# Patient Record
Sex: FEMALE | URBAN - METROPOLITAN AREA
[De-identification: names, ages, dates, MRNs, and addresses within clinical notes are randomized per-mention and may not be internally consistent; named-entity substitution may affect disease eponyms.]

---

## 2019-11-05 ENCOUNTER — APPOINTMENT (OUTPATIENT)
Age: 59
Setting detail: DERMATOLOGY
End: 2019-11-06

## 2019-11-05 DIAGNOSIS — Z41.9 ENCOUNTER FOR PROCEDURE FOR PURPOSES OTHER THAN REMEDYING HEALTH STATE, UNSPECIFIED: ICD-10-CM

## 2019-11-05 PROCEDURE — OTHER CHEMICAL PEEL: OTHER

## 2019-11-05 ASSESSMENT — LOCATION ZONE DERM
LOCATION ZONE: FACE
LOCATION ZONE: NECK
LOCATION ZONE: LIP
LOCATION ZONE: NOSE

## 2019-11-05 ASSESSMENT — LOCATION DETAILED DESCRIPTION DERM
LOCATION DETAILED: SUPERIOR MID FOREHEAD
LOCATION DETAILED: LEFT CENTRAL MALAR CHEEK
LOCATION DETAILED: RIGHT UPPER CUTANEOUS LIP
LOCATION DETAILED: LEFT LOWER CUTANEOUS LIP
LOCATION DETAILED: LEFT SUPERIOR LATERAL NECK
LOCATION DETAILED: RIGHT SUPERIOR LATERAL NECK
LOCATION DETAILED: NASAL DORSUM
LOCATION DETAILED: RIGHT CENTRAL MALAR CHEEK

## 2019-11-05 ASSESSMENT — LOCATION SIMPLE DESCRIPTION DERM
LOCATION SIMPLE: SUPERIOR FOREHEAD
LOCATION SIMPLE: RIGHT CHEEK
LOCATION SIMPLE: RIGHT LIP
LOCATION SIMPLE: LEFT CHEEK
LOCATION SIMPLE: LEFT LIP
LOCATION SIMPLE: RIGHT ANTERIOR NECK
LOCATION SIMPLE: LEFT ANTERIOR NECK
LOCATION SIMPLE: NOSE

## 2019-11-05 NOTE — HPI: COSMETIC (FACIAL)
Have You Had A Facial Before?: has not had a previous facial
When Outside In The Sun, Do You...: mostly tans, rarely burns
Additional History: Pt here for chemical peel. Went over expectations during and after peel. Pt also bought microneedling package in 2017 and did not like procedure and is using remainder dollar amount for other services. Balance remaining after today’s service is $1025.\\nJessner peel. \\nPCA toner. Poly eyes lips nose. Fan. PCA peel with HQ and res. 2 passes face and neck. Ultra peel 1. 2 passes face. Intensive brightening and age refining. Booster. Rebalance.

## 2019-11-05 NOTE — PROCEDURE: CHEMICAL PEEL
Prep: The treated area was degreased with pre-peel cleanser, and vaseline was applied for protection of mucous membranes.
Number Of Layers: 4
Chemical Peel: Clementina
Treatment Number: 1
Consent: Prior to the procedure, written consent was obtained and risks were reviewed, including but not limited to: redness, peeling, blistering, pigmentary change, scarring, infection, and pain.
Detail Level: Zone
Post Peel Care: After the procedure, a post-peel cream was applied to the treated areas. Sun protection and post-care instructions were reviewed with the patient.
Booster To Be Used At Home?: no
Price (Use Numbers Only, No Special Characters Or $): 0
Post-Care Instructions: I reviewed with the patient in detail post-care instructions. Patient should avoid sun exposure and wear sun protection.
Treatment Time (Optional): 30 minute

## 2020-02-18 ENCOUNTER — APPOINTMENT (OUTPATIENT)
Age: 60
Setting detail: DERMATOLOGY
End: 2020-02-20

## 2020-02-18 DIAGNOSIS — Z41.9 ENCOUNTER FOR PROCEDURE FOR PURPOSES OTHER THAN REMEDYING HEALTH STATE, UNSPECIFIED: ICD-10-CM

## 2020-02-18 PROCEDURE — OTHER DERMASWEEP: OTHER

## 2020-02-18 ASSESSMENT — LOCATION ZONE DERM: LOCATION ZONE: FACE

## 2020-02-18 ASSESSMENT — LOCATION DETAILED DESCRIPTION DERM: LOCATION DETAILED: RIGHT MEDIAL FOREHEAD

## 2020-02-18 ASSESSMENT — LOCATION SIMPLE DESCRIPTION DERM: LOCATION SIMPLE: RIGHT FOREHEAD

## 2020-02-18 NOTE — HPI: COSMETIC (FACIAL)
Have You Had A Facial Before?: has had a previous facial
When Outside In The Sun, Do You...: mostly tans, rarely burns
Additional History: Dermasweep with infusion.\\nCleanse with BFW. Remove with hot towels. Alcohol prep, microderm with black tip 3 passes face 2 passes neck. Face 12. Neck 8. Eyes 4. Nose 12. Cool towel. Nucell infusion with silk tip. Face 10. Neck 8. Eyes 4. Nose 10. Intensive age and brightening. B3 serum. Vitamin c and hydrating serum intellishade original. Rec deep pore facial for extractions.

## 2020-03-09 ENCOUNTER — APPOINTMENT (OUTPATIENT)
Age: 60
Setting detail: DERMATOLOGY
End: 2020-03-10

## 2020-03-09 DIAGNOSIS — Z41.9 ENCOUNTER FOR PROCEDURE FOR PURPOSES OTHER THAN REMEDYING HEALTH STATE, UNSPECIFIED: ICD-10-CM

## 2020-03-09 DIAGNOSIS — L90.8 OTHER ATROPHIC DISORDERS OF SKIN: ICD-10-CM

## 2020-03-09 PROCEDURE — OTHER ADDITIONAL NOTES: OTHER

## 2020-03-09 PROCEDURE — OTHER BOTOX: OTHER

## 2020-03-09 PROCEDURE — OTHER FACIAL: OTHER

## 2020-03-09 ASSESSMENT — LOCATION ZONE DERM
LOCATION ZONE: FACE
LOCATION ZONE: SCALP
LOCATION ZONE: SCALP
LOCATION ZONE: FACE

## 2020-03-09 ASSESSMENT — LOCATION DETAILED DESCRIPTION DERM
LOCATION DETAILED: LEFT SUPERIOR FOREHEAD
LOCATION DETAILED: SUPERIOR MID FOREHEAD
LOCATION DETAILED: LEFT CENTRAL OCCIPITAL SCALP
LOCATION DETAILED: LEFT LATERAL EYEBROW
LOCATION DETAILED: LEFT INFERIOR MEDIAL FOREHEAD
LOCATION DETAILED: LEFT CENTRAL FRONTAL SCALP
LOCATION DETAILED: RIGHT INFERIOR FOREHEAD
LOCATION DETAILED: LEFT INFERIOR FOREHEAD
LOCATION DETAILED: LEFT SUPERIOR MEDIAL FOREHEAD
LOCATION DETAILED: LEFT MID TEMPLE
LOCATION DETAILED: GLABELLA
LOCATION DETAILED: RIGHT SUPERIOR LATERAL MALAR CHEEK
LOCATION DETAILED: LEFT INFERIOR TEMPLE
LOCATION DETAILED: RIGHT INFERIOR MEDIAL FOREHEAD
LOCATION DETAILED: RIGHT INFERIOR TEMPLE
LOCATION DETAILED: RIGHT SUPERIOR LATERAL FOREHEAD
LOCATION DETAILED: RIGHT LATERAL EYEBROW
LOCATION DETAILED: RIGHT CENTRAL FRONTAL SCALP

## 2020-03-09 ASSESSMENT — LOCATION SIMPLE DESCRIPTION DERM
LOCATION SIMPLE: GLABELLA
LOCATION SIMPLE: SCALP
LOCATION SIMPLE: RIGHT TEMPLE
LOCATION SIMPLE: RIGHT SCALP
LOCATION SIMPLE: LEFT EYEBROW
LOCATION SIMPLE: RIGHT FOREHEAD
LOCATION SIMPLE: RIGHT CHEEK
LOCATION SIMPLE: LEFT TEMPLE
LOCATION SIMPLE: RIGHT EYEBROW
LOCATION SIMPLE: LEFT FOREHEAD
LOCATION SIMPLE: LEFT SCALP
LOCATION SIMPLE: SUPERIOR FOREHEAD

## 2020-03-09 NOTE — PROCEDURE: ADDITIONAL NOTES
Additional Notes: Schedule filler later this week since patient just had a facial today
Detail Level: Simple
Additional Notes: Total 50 units injected today

## 2020-03-09 NOTE — PROCEDURE: FACIAL
Mask Type (Optional): purifying
Treatment Type (Optional): Deep Cleanse Treatment
Exfoliation Type: exfoliant
Price (Use Numbers Only, No Special Characters Or $): 0
Detail Level: Zone

## 2020-03-12 ENCOUNTER — APPOINTMENT (OUTPATIENT)
Age: 60
Setting detail: DERMATOLOGY
End: 2020-03-17

## 2020-03-12 DIAGNOSIS — L90.8 OTHER ATROPHIC DISORDERS OF SKIN: ICD-10-CM

## 2020-03-12 PROCEDURE — OTHER JUVEDERM VOLBELLA INJECTION: OTHER

## 2020-03-12 PROCEDURE — OTHER JUVEDERM VOLUMA XC INJECTION: OTHER

## 2020-03-12 PROCEDURE — OTHER ADDITIONAL NOTES: OTHER

## 2020-03-12 PROCEDURE — OTHER BOTOX: OTHER

## 2020-03-12 PROCEDURE — OTHER JUVEDERM ULTRA PLUS XC INJECTION: OTHER

## 2020-03-12 NOTE — PROCEDURE: JUVEDERM ULTRA PLUS XC INJECTION
Additional Area 4 Volume In Cc: 0
Include Cannula Length?: 2 inch
Consent: Written consent obtained. Risks include but not limited to bruising, beading, irregular texture, ulceration, infection, allergic reaction, scar formation, incomplete augmentation, temporary nature, procedural pain.
Include Cannula Size?: 25G
Price (Use Numbers Only, No Special Characters Or $): 900.0
Procedural Text: The filler was administered to the treatment areas noted above. Used total 2 syringes. I per side to improve NLF and LMF
Filler: Juvederm Ultra Plus XC
Map Statment: See Attach Map for Complete Details
Post-Care Instructions: Patient instructed to apply ice to reduce swelling.
Number Of Syringes (Required For Inventory): 1
Include Cannula Information In Note?: Yes
Use Map Statement For Sites (Optional): No
Detail Level: Detailed

## 2020-03-12 NOTE — PROCEDURE: JUVEDERM VOLBELLA INJECTION
Procedural Text: The filler was administered to the treatment areas noted above. Used 1 syringe Volbella 1.0 cc for perioral vertical rhytids and lip augmentation

## 2020-03-12 NOTE — PROCEDURE: JUVEDERM VOLUMA XC INJECTION
Nasolabial Folds Filler Volume In Cc: 0
Include Cannula Size?: 27G
Include Cannula Length?: 1 inch
Post-Care Instructions: Patient instructed to apply ice to reduce swelling.
Map Statment: See Attach Map for Complete Details
Filler: Juvederm Voluma XC
Detail Level: Simple
Procedural Text: The filler was administered to the treatment areas noted above with total 1syringes  of Voluma XC
Use Map Statement For Sites (Optional): No
Price (Use Numbers Only, No Special Characters Or $): 750.0
Number Of Syringes (Required For Inventory): 1
Include Cannula Information In Note?: Yes
Consent: Written consent obtained. Risks include but not limited to bruising, beading, irregular texture, ulceration, infection, allergic reaction, scar formation, incomplete augmentation, temporary nature, procedural pain.

## 2021-11-29 ENCOUNTER — HOSPITAL ENCOUNTER (EMERGENCY)
Facility: HOSPITAL | Age: 61
Discharge: HOME | End: 2021-11-29
Attending: EMERGENCY MEDICINE

## 2021-11-29 VITALS
DIASTOLIC BLOOD PRESSURE: 101 MMHG | TEMPERATURE: 98 F | BODY MASS INDEX: 29.62 KG/M2 | RESPIRATION RATE: 18 BRPM | HEART RATE: 66 BPM | SYSTOLIC BLOOD PRESSURE: 170 MMHG | HEIGHT: 69 IN | WEIGHT: 200 LBS | OXYGEN SATURATION: 100 %

## 2021-11-29 DIAGNOSIS — M54.30 SCIATICA, UNSPECIFIED LATERALITY: Primary | ICD-10-CM

## 2021-11-29 DIAGNOSIS — M79.605 PAIN OF LEFT LOWER EXTREMITY: ICD-10-CM

## 2021-11-29 DIAGNOSIS — I10 ELEVATED BLOOD PRESSURE READING WITH DIAGNOSIS OF HYPERTENSION: ICD-10-CM

## 2021-11-29 PROCEDURE — 96372 THER/PROPH/DIAG INJ SC/IM: CPT

## 2021-11-29 PROCEDURE — 99284 EMERGENCY DEPT VISIT MOD MDM: CPT

## 2021-11-29 PROCEDURE — 63600175 PHARM REV CODE 636 W HCPCS: Performed by: NURSE PRACTITIONER

## 2021-11-29 RX ORDER — LIDOCAINE 50 MG/G
1 PATCH TOPICAL DAILY
Qty: 15 PATCH | Refills: 0 | Status: SHIPPED | OUTPATIENT
Start: 2021-11-29

## 2021-11-29 RX ORDER — METHOCARBAMOL 500 MG/1
1000 TABLET, FILM COATED ORAL 3 TIMES DAILY
Qty: 30 TABLET | Refills: 0 | Status: SHIPPED | OUTPATIENT
Start: 2021-11-29 | End: 2021-12-04

## 2021-11-29 RX ORDER — ORPHENADRINE CITRATE 30 MG/ML
60 INJECTION INTRAMUSCULAR; INTRAVENOUS
Status: COMPLETED | OUTPATIENT
Start: 2021-11-29 | End: 2021-11-29

## 2021-11-29 RX ADMIN — ORPHENADRINE CITRATE 60 MG: 60 INJECTION INTRAMUSCULAR; INTRAVENOUS at 05:11

## 2024-10-09 NOTE — PROCEDURE: BOTOX
Please check at your local pharmacy Shingrix vaccine coverage.  Schedule annual CT lung cancer screening.  Recommended mammogram and DEXA/bone density test for osteoporosis.  Schedule follow up at [facility].      Medicare Wellness Visit  Plan for Preventive Care    A good way for you to stay healthy is to use preventive care.  Medicare covers many services that can help you stay healthy.* The goal of these services is to find any health problems as quickly as possible. Finding problems early can help make them easier to treat.  Your personal plan below lists the services you may need and when they are due.      Health Maintenance Summary       Shingles Vaccine (1 of 2)  Never done    Medicare Advantage- Medicare Wellness Visit (Yearly - January to December)  Overdue since 1/1/2024    Lung Cancer Screening (Yearly)  Overdue since 7/21/2024    COVID-19 Vaccine (5 - 2023-24 season)  Overdue since 9/1/2024    Influenza Vaccine (1)  Due since 9/1/2024    DTaP/Tdap/Td Vaccine (2 - Td or Tdap)  Next due on 10/19/2025    Hepatitis C Screening   Completed    Pneumococcal Vaccine 65+   Completed    Osteoporosis Screening   Completed    Meningococcal Vaccine   Aged Out    Hepatitis B Vaccine (For Physician/APC Discussion)   Aged Out    HPV Vaccine   Aged Out    Colorectal Cancer Screen   Discontinued             Preventive Care for Women and Men    Heart Screenings (Cardiovascular):  Blood tests are used to check your cholesterol, lipid and triglyceride levels. High levels can increase your risk for heart disease and stroke. High levels can be treated with medications, diet and exercise. Lowering your levels can help keep your heart and blood vessels healthy.  Your provider will order these tests if they are needed.    An ultrasound is done to see if you have an abdominal aortic aneurysm (AAA).  This is an enlargement of one of the main blood vessels that delivers blood to the body.   In the [place], 9,000  deaths are caused by AAA.  You may not even know you have this problem and as many as 1 in 3 people will have a serious problem if it is not treated.  Early diagnosis allows for more effective treatment and cure.  If you have a family history of AAA or are a male age 65-75 who has smoked, you are at higher risk of an AAA.  Your provider can order this test, if needed.    Colorectal Screening:  There are many tests that are used to check for cancer of your colon and rectum. You and your provider should discuss what test is best for you and when to have it done.  Options include:  Screening Colonoscopy: exam of the entire colon, seen through a flexible lighted tube.  Flexible Sigmoidoscopy: exam of the last third (sigmoid portion) of the colon and rectum, seen through a flexible lighted tube.  Cologuard DNA stool test: a sample of your stool is used to screen for cancer and unseen blood in your stool.  Fecal Occult Blood Test: a sample of your stool is studied to find any unseen blood    Flu Shot:  An immunization that helps to prevent influenza (the flu). You should get this every year. The best time to get the shot is in the fall.    Pneumococcal Shot:  Vaccines help prevent pneumococcal disease, which is any type of illness caused by Streptococcus pneumoniae bacteria. There are two kinds of pneumococcal vaccines available in the [place]:   Pneumococcal conjugate vaccines (PCV20 or Anfhyto75®)  Pneumococcal polysaccharide vaccine (PPSV23 or Wpquckkze89®)  For those who have never received any pneumococcal conjugate vaccine, CDC recommends PVC20 for adults 65 years or older and adults 19 through 64 years old with certain medical conditions or risk factors.   For those who have previously received PCV13, this should be followed by a dose of PPSV23.     Hepatitis B Shot:  An immunization that helps to protect people from getting Hepatitis B. Hepatitis B is a virus that spreads through contact with infected blood  or body fluids. Many people with the virus do not have symptoms.  The virus can lead to serious problems, such as liver disease. Some people are at higher risk than others. Your doctor will tell you if you need this shot.     Diabetes Screening:  A test to measure sugar (glucose) in your blood is called a fasting blood sugar. Fasting means you cannot have food or drink for at least 8 hours before the test. This test can detect diabetes long before you may notice symptoms.    Glaucoma Screening:  Glaucoma screening is performed by your eye doctor. The test measures the fluid pressure inside your eyes to determine if you have glaucoma.     Hepatitis C Screening:  A blood test to see if you have the hepatitis C virus.  Hepatitis C attacks the liver and is a major cause of chronic liver disease.  Medicare will cover a single screening for all adults born between 1945 & 1965, or high risk patients (people who have injected illegal drugs or people who have had blood transfusions).  High risk patients who continue to inject illegal drugs can be screened for Hepatitis C every year.    Smoking and Tobacco-Use Cessation Counseling:  Tobacco is the single greatest cause of disease and early death in our country today. Medication and counseling together can increase a person’s chance of quitting for good.   Medicare covers two quitting attempts per year, with four counseling sessions per attempt (eight sessions in a 12 month period)    Preventive Screening tests for Women    Screening Mammograms and Breast Exams:  An x-ray of your breasts to check for breast cancer before you or your doctor may be able to feel it.  If breast cancer is found early it can usually be treated with success.    Pelvic Exams and Pap Tests:  An exam to check for cervical and vaginal cancer. A Pap test is a lab test in which cells are taken from your cervix and sent to the lab to look for signs of cervical cancer. If cancer of the cervix is found early,  Detail Level: Detailed chances for a cure are good. Testing can generally end at age 65, or if a woman has a hysterectomy for a benign condition. Your provider may recommend more frequent testing if certain abnormal results are found.    Bone Mass Measurements:  A painless x-ray of your bone density to see if you are at risk for a broken bone. Bone density refers to the thickness of bones or how tightly the bone tissue is packed.    Preventive Screening tests for Men    Prostate Screening:  Should you have a prostate cancer test (PSA)?  It is up to you to decide if you want a prostate cancer test. Talk to your clinician to find out if the test is right for you.  Things for you to consider and talk about should include:  Benefits and harms of the test  Your family history  How your race/ethnicity may influence the test  If the test may impact other medical conditions you have  Your values on screenings and treatments    *Medicare pays for many preventive services to keep you healthy. For some of these services, you might have to pay a deductible, coinsurance, and / or copayment.  The amounts vary depending on the type of services you need and the kind of Medicare health plan you have.    For further details on screenings offered by Medicare please visit: [email or web address]://[email or web address].gov/coverage/[email or web address]-[email or web address]-services